# Patient Record
Sex: FEMALE | Race: WHITE | NOT HISPANIC OR LATINO | Employment: FULL TIME | URBAN - METROPOLITAN AREA
[De-identification: names, ages, dates, MRNs, and addresses within clinical notes are randomized per-mention and may not be internally consistent; named-entity substitution may affect disease eponyms.]

---

## 2024-11-04 ENCOUNTER — HOSPITAL ENCOUNTER (EMERGENCY)
Facility: HOSPITAL | Age: 39
Discharge: HOME | End: 2024-11-04
Attending: STUDENT IN AN ORGANIZED HEALTH CARE EDUCATION/TRAINING PROGRAM | Admitting: STUDENT IN AN ORGANIZED HEALTH CARE EDUCATION/TRAINING PROGRAM
Payer: COMMERCIAL

## 2024-11-04 ENCOUNTER — APPOINTMENT (OUTPATIENT)
Dept: GENERAL RADIOLOGY | Facility: HOSPITAL | Age: 39
End: 2024-11-04
Payer: COMMERCIAL

## 2024-11-04 VITALS
OXYGEN SATURATION: 97 % | RESPIRATION RATE: 16 BRPM | HEART RATE: 80 BPM | HEIGHT: 63 IN | WEIGHT: 293 LBS | BODY MASS INDEX: 51.91 KG/M2 | DIASTOLIC BLOOD PRESSURE: 68 MMHG | SYSTOLIC BLOOD PRESSURE: 121 MMHG | TEMPERATURE: 98.1 F

## 2024-11-04 DIAGNOSIS — M17.12 OSTEOARTHRITIS OF LEFT KNEE, UNSPECIFIED OSTEOARTHRITIS TYPE: ICD-10-CM

## 2024-11-04 DIAGNOSIS — M25.562 LEFT KNEE PAIN, UNSPECIFIED CHRONICITY: Primary | ICD-10-CM

## 2024-11-04 PROCEDURE — 73560 X-RAY EXAM OF KNEE 1 OR 2: CPT

## 2024-11-04 PROCEDURE — 99283 EMERGENCY DEPT VISIT LOW MDM: CPT

## 2024-11-04 RX ORDER — IBUPROFEN 800 MG/1
800 TABLET, FILM COATED ORAL EVERY 8 HOURS PRN
Qty: 30 TABLET | Refills: 0 | Status: SHIPPED | OUTPATIENT
Start: 2024-11-04

## 2024-11-04 NOTE — ED NOTES
"Pt reports knee pain gradually increasing since this past friday. Pt states she has had decreased ability to ambulate r/t pain. Pt states she is able to crawl on hands and knees without difficulty and reports \"left knee feels squishy.\" Pt c/o posterior and medial left knee pain. Pt denies any travel.   "

## 2024-11-04 NOTE — ED PROVIDER NOTES
EMERGENCY DEPARTMENT ENCOUNTER  Room Number:  01/01  PCP: Jennifer Mancilla APRN  Independent Historians: Patient      HPI:  Chief Complaint: had concerns including Knee Pain.     A complete HPI/ROS/PMH/PSH/SH/FH are unobtainable due to: None    Chronic or social conditions impacting patient care (Social Determinants of Health): None      Context: The patient is a 39 y.o. female with no significant past medical history who presents to the ED c/o acute left knee pain.  Patient notes that she has had progressively worsening left knee pain ongoing for several months. She states 3 days ago she developed acute worsening of her left knee pain. No recent falls or injury.  She describes it as constant aching pain to the medial aspect of the knee and posteriorly, worse with ambulation. She states that when the pain first started she was able to ambulate with a limp but today she was unable to ambulate.  She notes swelling to the knee without redness or warmth.  She does note that she recently got a new car that is smaller than her previous vehicle and that she has to keep her knee bent at an angle which  is aggravating her pain. No recent sick contact or illness. No recent travel. No estrogen usage. Denies chest pain, shortness of breath, calf pain, fever, chills, nausea, vomiting, diarrhea.       Review of prior external notes (non-ED) -and- Review of prior external test results outside of this encounter: Extensive review of the EPIC system as well as [facility] reveals no prior visit notes and no prior diagnostic studies available for review.        PAST MEDICAL HISTORY  Active Ambulatory Problems     Diagnosis Date Noted    No Active Ambulatory Problems     Resolved Ambulatory Problems     Diagnosis Date Noted    No Resolved Ambulatory Problems     No Additional Past Medical History         PAST SURGICAL HISTORY  No past surgical history on file.      FAMILY HISTORY  No family history on file.      SOCIAL  HISTORY  Social History     Socioeconomic History    Marital status:          ALLERGIES  Patient has no known allergies.      REVIEW OF SYSTEMS  Review of Systems  Included in HPI  All systems reviewed and negative except for those discussed in HPI.      PHYSICAL EXAM    I have reviewed the triage vital signs and nursing notes.    ED Triage Vitals   Temp Heart Rate Resp BP SpO2   11/04/24 0737 11/04/24 0737 11/04/24 0737 11/04/24 0752 11/04/24 0737   98.1 °F (36.7 °C) 93 15 145/88 98 %      Temp src Heart Rate Source Patient Position BP Location FiO2 (%)   -- -- -- -- --              Physical Exam  GENERAL: alert, no acute distress  SKIN: Warm, dry, no erythema or warmth to the left knee  HENT: Normocephalic, atraumatic  EYES: no scleral icterus  CV: regular rhythm, regular rate, no murmurs  RESPIRATORY: normal effort, lungs clear  ABDOMEN: nondistended  MUSCULOSKELETAL: no deformity, no left knee TTP, mild left knee edema, full active and passive range of motion to the left hip, left knee, left ankle, normal and symmetric DP, PT, and popliteal pulses.,  No joint laxity appreciated, lower leg extension intact.  Negative Lachman's test  NEURO: alert, moves all extremities, follows commands, sensation intact            LAB RESULTS  No results found for this or any previous visit (from the past 24 hours).      RADIOLOGY  XR Knee 1 or 2 View Left    Result Date: 11/4/2024  XR KNEE 1 OR 2 VW LEFT-11/4/2024  HISTORY: Left knee pain.  Minimal hypertrophic change of the posterior patella is seen. No acute bone, joint or soft tissue abnormalities are seen.      1. Minimal degenerative arthritis of the left knee. 2. No acute process identified.   This report was finalized on 11/4/2024 8:21 AM by Dr. Jono Narayan M.D on Workstation: [id number]         MEDICATIONS GIVEN IN ER  Medications - No data to display      ORDERS PLACED DURING THIS VISIT:  Orders Placed This Encounter   Procedures    [facility] 11.   Crutches, 05.  Knee Immobilizer; Prevents Accomplishing MRADLs; Able to Safely Use Equipment; Mobility Deficit Can Be Sufficiently Resolved By Use of Equipment    XR Knee 1 or 2 View Left         OUTPATIENT MEDICATION MANAGEMENT:  No current Epic-ordered facility-administered medications on file.     Current Outpatient Medications Ordered in Epic   Medication Sig Dispense Refill    Diclofenac Sodium (Voltaren) 1 % gel gel Apply 4 g topically to the appropriate area as directed 4 (Four) Times a Day As Needed (pain). 150 g 0    ibuprofen (ADVIL,MOTRIN) 800 MG tablet Take 1 tablet by mouth Every 8 (Eight) Hours As Needed (pain. take with food.). 30 tablet 0         PROCEDURES  Procedures            PROGRESS, DATA ANALYSIS, CONSULTS, AND MEDICAL DECISION MAKING  All labs have been independently interpreted by me.  All radiology studies have been reviewed by me. All EKG's have been independently viewed and interpreted by me.  Discussion below represents my analysis of pertinent findings related to patient's condition, differential diagnosis, treatment plan and final disposition.    DIFFERENTIAL    Differential diagnosis includes but is not limited to patellar tendinitis, patellar fracture, patellofemoral pain syndrome, CCPD arthropathy, osteoarthritis, bursitis, DVT, osteomyelitis, reactive arthritis, septic arthritis.     Clinical Scores:                       Patient's x-ray shows some minor degenerative changes.  Symptoms concerning for medial meniscal injury.  Knee immobilizer applied and provided with crutches to assist with weightbearing.  Recommended NSAID, close follow-up with orthopedics.      AS OF 15:59 EST VITALS:    BP - 121/68  HR - 80  TEMP - 98.1 °F (36.7 °C)  O2 SATS - 97%    COMPLEXITY OF CARE  Admission was considered but after careful review of the patient's presentation, physical examination, diagnostic results, and response to treatment the patient may be safely discharged with outpatient  follow-up.      DIAGNOSIS  Final diagnoses:   Left knee pain, unspecified chronicity   Osteoarthritis of left knee, unspecified osteoarthritis type         DISPOSITION  ED Disposition       ED Disposition   Discharge    Condition   Stable    Comment   --                  FOLLOW UP  Jerad Esquivel MD  [address] [address]  [phone]    In 1 week          Prescribed Medications     Medication List        New Prescriptions      Diclofenac Sodium 1 % gel gel  Commonly known as: Voltaren  Apply 4 g topically to the appropriate area as directed 4 (Four) Times a Day As Needed (pain).     ibuprofen 800 MG tablet  Commonly known as: ADVIL,MOTRIN  Take 1 tablet by mouth Every 8 (Eight) Hours As Needed (pain. take with food.).               Where to Get Your Medications        These medications were sent to [facility] [id number] - [place], [place] - [address] [place] [address] AT [facility] [address] [facility] [address] - [phone]  - [phone] [address], [place] [id number]      Phone: [phone]   Diclofenac Sodium 1 % gel gel  ibuprofen 800 MG tablet                   Please note that portions of this document were completed with a voice recognition program.    Note Disclaimer: At [facility], we believe that sharing information builds trust and better relationships. You are receiving this note because you recently visited [facility]. It is possible you will see health information before a provider has talked with you about it. This kind of information can be easy to misunderstand. To help you fully understand what it means for your health, we urge you to discuss this note with your provider.         Kisha Barragan PA-C  11/04/24 0950

## 2024-11-04 NOTE — DISCHARGE INSTRUCTIONS
prescriptions from pharmacy, take as prescribed.  Use knee immobilizer and crutches as needed.   Ice for 15 minutes 3-4 times a day.   Follow-up with orthopedics in 1 week.